# Patient Record
Sex: FEMALE | Race: WHITE | URBAN - METROPOLITAN AREA
[De-identification: names, ages, dates, MRNs, and addresses within clinical notes are randomized per-mention and may not be internally consistent; named-entity substitution may affect disease eponyms.]

---

## 2022-01-21 ENCOUNTER — EMERGENCY (EMERGENCY)
Facility: HOSPITAL | Age: 37
LOS: 1 days | Discharge: HOME | End: 2022-01-21
Attending: EMERGENCY MEDICINE
Payer: COMMERCIAL

## 2022-01-21 VITALS
TEMPERATURE: 98 F | WEIGHT: 154.98 LBS | RESPIRATION RATE: 18 BRPM | DIASTOLIC BLOOD PRESSURE: 83 MMHG | HEIGHT: 64 IN | OXYGEN SATURATION: 100 % | SYSTOLIC BLOOD PRESSURE: 147 MMHG | HEART RATE: 104 BPM

## 2022-01-21 LAB
ALBUMIN SERPL ELPH-MCNC: 4.6 G/DL — SIGNIFICANT CHANGE UP (ref 3.3–5)
ALP SERPL-CCNC: 80 U/L — SIGNIFICANT CHANGE UP (ref 40–120)
ALT FLD-CCNC: 15 U/L — SIGNIFICANT CHANGE UP (ref 10–45)
ANION GAP SERPL CALC-SCNC: 15 MMOL/L — SIGNIFICANT CHANGE UP (ref 5–17)
APTT BLD: 31.7 SEC — SIGNIFICANT CHANGE UP (ref 27.5–35.5)
AST SERPL-CCNC: 12 U/L — SIGNIFICANT CHANGE UP (ref 10–40)
BASOPHILS # BLD AUTO: 0.04 K/UL — SIGNIFICANT CHANGE UP (ref 0–0.2)
BASOPHILS NFR BLD AUTO: 0.3 % — SIGNIFICANT CHANGE UP (ref 0–2)
BILIRUB SERPL-MCNC: 0.4 MG/DL — SIGNIFICANT CHANGE UP (ref 0.2–1.2)
BUN SERPL-MCNC: 17 MG/DL — SIGNIFICANT CHANGE UP (ref 7–23)
CALCIUM SERPL-MCNC: 9.5 MG/DL — SIGNIFICANT CHANGE UP (ref 8.4–10.5)
CHLORIDE SERPL-SCNC: 104 MMOL/L — SIGNIFICANT CHANGE UP (ref 96–108)
CO2 SERPL-SCNC: 23 MMOL/L — SIGNIFICANT CHANGE UP (ref 22–31)
CREAT SERPL-MCNC: 0.94 MG/DL — SIGNIFICANT CHANGE UP (ref 0.5–1.3)
EOSINOPHIL # BLD AUTO: 0.22 K/UL — SIGNIFICANT CHANGE UP (ref 0–0.5)
EOSINOPHIL NFR BLD AUTO: 1.9 % — SIGNIFICANT CHANGE UP (ref 0–6)
GLUCOSE SERPL-MCNC: 91 MG/DL — SIGNIFICANT CHANGE UP (ref 70–99)
HCG SERPL-ACNC: <2 MIU/ML — SIGNIFICANT CHANGE UP
HCT VFR BLD CALC: 40.5 % — SIGNIFICANT CHANGE UP (ref 34.5–45)
HGB BLD-MCNC: 13.6 G/DL — SIGNIFICANT CHANGE UP (ref 11.5–15.5)
IMM GRANULOCYTES NFR BLD AUTO: 0.3 % — SIGNIFICANT CHANGE UP (ref 0–1.5)
INR BLD: 1.05 RATIO — SIGNIFICANT CHANGE UP (ref 0.88–1.16)
LYMPHOCYTES # BLD AUTO: 2.81 K/UL — SIGNIFICANT CHANGE UP (ref 1–3.3)
LYMPHOCYTES # BLD AUTO: 24.3 % — SIGNIFICANT CHANGE UP (ref 13–44)
MCHC RBC-ENTMCNC: 31.5 PG — SIGNIFICANT CHANGE UP (ref 27–34)
MCHC RBC-ENTMCNC: 33.6 GM/DL — SIGNIFICANT CHANGE UP (ref 32–36)
MCV RBC AUTO: 93.8 FL — SIGNIFICANT CHANGE UP (ref 80–100)
MONOCYTES # BLD AUTO: 1.14 K/UL — HIGH (ref 0–0.9)
MONOCYTES NFR BLD AUTO: 9.8 % — SIGNIFICANT CHANGE UP (ref 2–14)
NEUTROPHILS # BLD AUTO: 7.33 K/UL — SIGNIFICANT CHANGE UP (ref 1.8–7.4)
NEUTROPHILS NFR BLD AUTO: 63.4 % — SIGNIFICANT CHANGE UP (ref 43–77)
NRBC # BLD: 0 /100 WBCS — SIGNIFICANT CHANGE UP (ref 0–0)
NT-PROBNP SERPL-SCNC: 21 PG/ML — SIGNIFICANT CHANGE UP (ref 0–300)
PLATELET # BLD AUTO: 375 K/UL — SIGNIFICANT CHANGE UP (ref 150–400)
POTASSIUM SERPL-MCNC: 4 MMOL/L — SIGNIFICANT CHANGE UP (ref 3.5–5.3)
POTASSIUM SERPL-SCNC: 4 MMOL/L — SIGNIFICANT CHANGE UP (ref 3.5–5.3)
PROT SERPL-MCNC: 8.1 G/DL — SIGNIFICANT CHANGE UP (ref 6–8.3)
PROTHROM AB SERPL-ACNC: 12.6 SEC — SIGNIFICANT CHANGE UP (ref 10.6–13.6)
RBC # BLD: 4.32 M/UL — SIGNIFICANT CHANGE UP (ref 3.8–5.2)
RBC # FLD: 12.3 % — SIGNIFICANT CHANGE UP (ref 10.3–14.5)
SARS-COV-2 RNA SPEC QL NAA+PROBE: SIGNIFICANT CHANGE UP
SODIUM SERPL-SCNC: 142 MMOL/L — SIGNIFICANT CHANGE UP (ref 135–145)
TROPONIN T, HIGH SENSITIVITY RESULT: <6 NG/L — SIGNIFICANT CHANGE UP (ref 0–51)
WBC # BLD: 11.58 K/UL — HIGH (ref 3.8–10.5)
WBC # FLD AUTO: 11.58 K/UL — HIGH (ref 3.8–10.5)

## 2022-01-21 PROCEDURE — 93010 ELECTROCARDIOGRAM REPORT: CPT

## 2022-01-21 PROCEDURE — 71045 X-RAY EXAM CHEST 1 VIEW: CPT | Mod: 26

## 2022-01-21 PROCEDURE — 99285 EMERGENCY DEPT VISIT HI MDM: CPT | Mod: 25

## 2022-01-21 NOTE — ED PROVIDER NOTE - NSFOLLOWUPCLINICS_GEN_ALL_ED_FT
[facility] Pulmonolgy and Sleep Medicine  Pulmonology  [address] [place] [address], [place] [address]  Phone: [phone]  Fax:

## 2022-01-21 NOTE — ED PROVIDER NOTE - RAPID ASSESSMENT
36 F was COVID positive about 3 weeks ago now presents to the ED with SOB. Endorses SOB since COVID postive. Attests to ongoing cough. Denies fever and pain on inspiration.     Maximus MUNROE (Sherley) have documented this rapid assessment note under the dictation of Ana Myers (PA) which has been reviewed and affirmed to be accurate. Patient was seen as a QPA patient. The patient will be seen and further worked up in the main emergency department and their care will be completed by the main emergency department team along with a thorough physical exam. Receiving team will follow up on labs, analgesia, any clinical imaging, reassess and disposition as clinically indicated, all decisions regarding the progression of care will be made at their discretion. 36 F was COVID positive about 3 weeks ago now presents to the ED with SOB. Endorses SOB since COVID postive. Attests to ongoing cough. Denies fever and pain on inspiration.     IMaximus (Sherley) have documented this rapid assessment note under the dictation of Ana Myers (PA) which has been reviewed and affirmed to be accurate. Patient was seen as a QPA patient. The patient will be seen and further worked up in the main emergency department and their care will be completed by the main emergency department team along with a thorough physical exam. Receiving team will follow up on labs, analgesia, any clinical imaging, reassess and disposition as clinically indicated, all decisions regarding the progression of care will be made at their discretion.    Rapid assessment by Ana Myers PA-C full eval to be performed in ED. Above documentation completed by scribe above. I was present for and agree with documentation.   Ana Myers PA-C

## 2022-01-21 NOTE — ED PROVIDER NOTE - PROGRESS NOTE DETAILS
Christiano, PGY1 - Patient stable for discharge. Understands the Emergency Room work-up and discharge precautions.

## 2022-01-21 NOTE — ED PROVIDER NOTE - OBJECTIVE STATEMENT
35yo woman with COVID infection around [place], improved, coming in 2/2 acute worsening last night when she woke up from sleep gasping for air. Took about 15 min for her to go back to baseline breathing. Patient has noticed that when she walks or exerts herself she needs to catch her breath before resuming activity. Cough every other day. Denies trouble swallowing, fever, N/V. States that she is anxious she will be unable to breath similar to the first few days of her infection, and that paramedics will need to be called.

## 2022-01-21 NOTE — ED PROVIDER NOTE - NSFOLLOWUPINSTRUCTIONS_ED_ALL_ED_FT
You were seen in the Emergency Room today because of gasping for air. Your chest x-ray did not show any infection. Your lab and imaging results are included in your discharge paperwork. Your COVID results have not come back yet; you can call tomorrow to see whether the results are back.     Please follow-up with your Primary Care Physician within the week for further management of your symptoms. You may also find it reassuring to see a pulmonologist for further workup. The contact information for the Clinic is included in your discharge paperwork.     Please return to the Emergency Room if:   •You have trouble breathing or shortness of breath at rest.  •You have chest pain or pressure that lasts longer than 5 minutes.  •You become confused or hard to wake.  •Your lips or face are blue.  •You have a fever of 104°F (40°C) or higher.

## 2022-01-21 NOTE — ED PROVIDER NOTE - CLINICAL SUMMARY MEDICAL DECISION MAKING FREE TEXT BOX
Christiano, PGY1 - 37yo with previous covid infection presenting with one episode of gasping during sleep, lung exam clear, no SOB at rest or with exertion, lungs clear on chest x-ray. labs non significant. patient stable for discharge. Christiano, PGY1 - 35yo with previous covid infection presenting with one episode of gasping during sleep, lung exam clear, no SOB at rest or with exertion, lungs clear on chest x-ray. labs non significant. patient stable for discharge.  David: 35yo who presents with an episode of waking up gasping last night. had covid weeks ago and is getting better. SOB decreasing slowly for days. episode lasted 10-15min last night. Will ensure this does not represent superinfection or deterioration and if ok will d/c

## 2022-01-21 NOTE — ED PROVIDER NOTE - ATTENDING CONTRIBUTION TO CARE
I performed a history and physical exam of the patient and discussed their management with the resident and /or advanced care provider. I reviewed the resident and /or ACP's note and agree with the documented findings and plan of care. My medical decision making and observations are found above.  Lungs clear, abd soft, nl O2 sat. speaking nl, swallowing nl

## 2022-01-21 NOTE — ED PROVIDER NOTE - PATIENT PORTAL LINK FT
You can access the FollowMyHealth Patient Portal offered by [facility] by registering at the following website: [email or web address]. By joining [employer]’s FollowMyHealth portal, you will also be able to view your health information using other applications (apps) compatible with our system.

## 2022-01-21 NOTE — ED PROVIDER NOTE - PHYSICAL EXAMINATION
Gen: NAD at rest or with ambulation, AOx3, able to make needs known, non-toxic, saturating 99% with ambulation   Head: NCAT  HEENT: EOMI, normal conjunctiva  Lung: CTAB, no respiratory distress, no wheezes/rhonchi/rales B/L, speaking in full sentences  CV: RRR, no M/R/G, pulses bilaterally   MSK: no visible bony deformities  Neuro: No focal sensory or motor deficits  Skin: Warm, well perfused, no rash  Psych: mildly anxious affect Gen: NAD at rest or with ambulation, AOx3, able to make needs known, non-toxic, saturating 99% with ambulation. O2sat 100% at rest, RR 18.  Head: NCAT  HEENT: EOMI, normal conjunctiva  Lung: CTAB, no respiratory distress, no wheezes/rhonchi/rales B/L, speaking in full sentences  CV: RRR, no M/R/G, pulses bilaterally   MSK: no visible bony deformities  Neuro: No focal sensory or motor deficits  Skin: Warm, well perfused, no rash  Psych: mildly anxious affect

## 2022-01-31 ENCOUNTER — EMERGENCY (EMERGENCY)
Facility: HOSPITAL | Age: 37
LOS: 1 days | Discharge: HOME | End: 2022-01-31
Attending: EMERGENCY MEDICINE
Payer: COMMERCIAL

## 2022-01-31 VITALS
TEMPERATURE: 99 F | SYSTOLIC BLOOD PRESSURE: 117 MMHG | OXYGEN SATURATION: 99 % | RESPIRATION RATE: 18 BRPM | DIASTOLIC BLOOD PRESSURE: 76 MMHG | WEIGHT: 164.91 LBS | HEART RATE: 98 BPM

## 2022-01-31 VITALS
DIASTOLIC BLOOD PRESSURE: 74 MMHG | OXYGEN SATURATION: 100 % | RESPIRATION RATE: 16 BRPM | SYSTOLIC BLOOD PRESSURE: 120 MMHG | TEMPERATURE: 98 F | HEART RATE: 99 BPM

## 2022-01-31 LAB
ALBUMIN SERPL ELPH-MCNC: 3.7 G/DL — SIGNIFICANT CHANGE UP (ref 3.5–5)
ALP SERPL-CCNC: 73 U/L — SIGNIFICANT CHANGE UP (ref 40–120)
ALT FLD-CCNC: 21 U/L DA — SIGNIFICANT CHANGE UP (ref 10–60)
ANION GAP SERPL CALC-SCNC: 8 MMOL/L — SIGNIFICANT CHANGE UP (ref 5–17)
AST SERPL-CCNC: 9 U/L — LOW (ref 10–40)
BASOPHILS # BLD AUTO: 0.04 K/UL — SIGNIFICANT CHANGE UP (ref 0–0.2)
BASOPHILS NFR BLD AUTO: 0.4 % — SIGNIFICANT CHANGE UP (ref 0–2)
BILIRUB SERPL-MCNC: 0.6 MG/DL — SIGNIFICANT CHANGE UP (ref 0.2–1.2)
BUN SERPL-MCNC: 17 MG/DL — SIGNIFICANT CHANGE UP (ref 7–18)
CALCIUM SERPL-MCNC: 9.2 MG/DL — SIGNIFICANT CHANGE UP (ref 8.4–10.5)
CHLORIDE SERPL-SCNC: 105 MMOL/L — SIGNIFICANT CHANGE UP (ref 96–108)
CO2 SERPL-SCNC: 25 MMOL/L — SIGNIFICANT CHANGE UP (ref 22–31)
CREAT SERPL-MCNC: 1.02 MG/DL — SIGNIFICANT CHANGE UP (ref 0.5–1.3)
D DIMER BLD IA.RAPID-MCNC: <150 NG/ML DDU — SIGNIFICANT CHANGE UP
EOSINOPHIL # BLD AUTO: 0.02 K/UL — SIGNIFICANT CHANGE UP (ref 0–0.5)
EOSINOPHIL NFR BLD AUTO: 0.2 % — SIGNIFICANT CHANGE UP (ref 0–6)
GLUCOSE SERPL-MCNC: 95 MG/DL — SIGNIFICANT CHANGE UP (ref 70–99)
HCG UR QL: NEGATIVE — SIGNIFICANT CHANGE UP
HCT VFR BLD CALC: 38.9 % — SIGNIFICANT CHANGE UP (ref 34.5–45)
HGB BLD-MCNC: 13.4 G/DL — SIGNIFICANT CHANGE UP (ref 11.5–15.5)
IMM GRANULOCYTES NFR BLD AUTO: 0.3 % — SIGNIFICANT CHANGE UP (ref 0–1.5)
LYMPHOCYTES # BLD AUTO: 1.82 K/UL — SIGNIFICANT CHANGE UP (ref 1–3.3)
LYMPHOCYTES # BLD AUTO: 19.5 % — SIGNIFICANT CHANGE UP (ref 13–44)
MCHC RBC-ENTMCNC: 31.1 PG — SIGNIFICANT CHANGE UP (ref 27–34)
MCHC RBC-ENTMCNC: 34.4 GM/DL — SIGNIFICANT CHANGE UP (ref 32–36)
MCV RBC AUTO: 90.3 FL — SIGNIFICANT CHANGE UP (ref 80–100)
MONOCYTES # BLD AUTO: 0.54 K/UL — SIGNIFICANT CHANGE UP (ref 0–0.9)
MONOCYTES NFR BLD AUTO: 5.8 % — SIGNIFICANT CHANGE UP (ref 2–14)
NEUTROPHILS # BLD AUTO: 6.9 K/UL — SIGNIFICANT CHANGE UP (ref 1.8–7.4)
NEUTROPHILS NFR BLD AUTO: 73.8 % — SIGNIFICANT CHANGE UP (ref 43–77)
NRBC # BLD: 0 /100 WBCS — SIGNIFICANT CHANGE UP (ref 0–0)
PLATELET # BLD AUTO: 438 K/UL — HIGH (ref 150–400)
POTASSIUM SERPL-MCNC: 3.9 MMOL/L — SIGNIFICANT CHANGE UP (ref 3.5–5.3)
POTASSIUM SERPL-SCNC: 3.9 MMOL/L — SIGNIFICANT CHANGE UP (ref 3.5–5.3)
PROT SERPL-MCNC: 8.5 G/DL — HIGH (ref 6–8.3)
RBC # BLD: 4.31 M/UL — SIGNIFICANT CHANGE UP (ref 3.8–5.2)
RBC # FLD: 11.7 % — SIGNIFICANT CHANGE UP (ref 10.3–14.5)
SODIUM SERPL-SCNC: 138 MMOL/L — SIGNIFICANT CHANGE UP (ref 135–145)
T4 AB SER-ACNC: 10.7 UG/DL — SIGNIFICANT CHANGE UP (ref 4.6–12)
TSH SERPL-MCNC: 1.85 UU/ML — SIGNIFICANT CHANGE UP (ref 0.34–4.82)
WBC # BLD: 9.35 K/UL — SIGNIFICANT CHANGE UP (ref 3.8–10.5)
WBC # FLD AUTO: 9.35 K/UL — SIGNIFICANT CHANGE UP (ref 3.8–10.5)

## 2022-01-31 PROCEDURE — 96361 HYDRATE IV INFUSION ADD-ON: CPT

## 2022-01-31 PROCEDURE — 99284 EMERGENCY DEPT VISIT MOD MDM: CPT

## 2022-01-31 PROCEDURE — 36415 COLL VENOUS BLD VENIPUNCTURE: CPT

## 2022-01-31 PROCEDURE — 96360 HYDRATION IV INFUSION INIT: CPT

## 2022-01-31 PROCEDURE — 99283 EMERGENCY DEPT VISIT LOW MDM: CPT | Mod: 25

## 2022-01-31 PROCEDURE — 84443 ASSAY THYROID STIM HORMONE: CPT

## 2022-01-31 PROCEDURE — 85025 COMPLETE CBC W/AUTO DIFF WBC: CPT

## 2022-01-31 PROCEDURE — 85379 FIBRIN DEGRADATION QUANT: CPT

## 2022-01-31 PROCEDURE — 93010 ELECTROCARDIOGRAM REPORT: CPT

## 2022-01-31 PROCEDURE — 93005 ELECTROCARDIOGRAM TRACING: CPT

## 2022-01-31 PROCEDURE — 84436 ASSAY OF TOTAL THYROXINE: CPT

## 2022-01-31 PROCEDURE — 81025 URINE PREGNANCY TEST: CPT

## 2022-01-31 PROCEDURE — 80053 COMPREHEN METABOLIC PANEL: CPT

## 2022-01-31 RX ORDER — SODIUM CHLORIDE 9 MG/ML
1000 INJECTION INTRAMUSCULAR; INTRAVENOUS; SUBCUTANEOUS ONCE
Refills: 0 | Status: COMPLETED | OUTPATIENT
Start: 2022-01-31 | End: 2022-01-31

## 2022-01-31 RX ADMIN — SODIUM CHLORIDE 1000 MILLILITER(S): 9 INJECTION INTRAMUSCULAR; INTRAVENOUS; SUBCUTANEOUS at 16:15

## 2022-01-31 RX ADMIN — SODIUM CHLORIDE 1000 MILLILITER(S): 9 INJECTION INTRAMUSCULAR; INTRAVENOUS; SUBCUTANEOUS at 14:17

## 2022-01-31 NOTE — ED PROVIDER NOTE - OBJECTIVE STATEMENT
36 year old patient with hx of COVID diagnosed December 2021, presenting for intermittent palpitations. She reports initial COVID symptoms at end of December including fatigue, sore throat, and fever that improved early January, however, she has over the past 2 weeks had worsening sensation of fullness in her throat. She is concerned she will be unable to breath and has been sleeping propped up in bed. She has decreased appetite over the past 3 days but denies food getting stuck in her throat/difficulty eating or drinking. She denies trigger for palpitations. She denies dizziness, headache, ear fullness, cough, fever, nausea, vomiting, and diarrhea. She denies history of recent travel, history of blood clot, family history of blood clot, and recent surgery. 36 year old patient with hx of COVID diagnosed December 2021, presenting for intermittent palpitations. She reports initial COVID symptoms at end of December including fatigue, sore throat, and fever that improved early January, however, she has over the past 2 weeks had worsening sensation of fullness in her throat. She presented to another ED last week where she was evaluated for palpitations and globus sensation. She was recommended to see pulmonologist for possible sleep study as potential etiology. She is concerned she will be unable to breath and has been sleeping propped up in bed. She has decreased appetite over the past 3 days but denies food getting stuck in her throat/difficulty eating or drinking. She denies trigger for palpitations. She denies dizziness, headache, ear fullness, cough, fever, nausea, vomiting, and diarrhea. She denies history of recent travel, history of blood clot, family history of blood clot, and recent surgery.

## 2022-01-31 NOTE — ED ADULT NURSE NOTE - CHIEF COMPLAINT QUOTE
pt biba as per the pt " kortney having palpitation for few days on and off " h/o covid positive few weeks ago

## 2022-01-31 NOTE — ED PROVIDER NOTE - CLINICAL SUMMARY MEDICAL DECISION MAKING FREE TEXT BOX
36 year old female with history of COVID in December, presenting with palpitations and SOB. Low suspicion for PE given negative family history, personal history or recent immobilization and negative d-dimer. TSH/T4 WNL. Recommended to patient that she follow up with PCP, pulmonologist, and psychologist/therapist.

## 2022-01-31 NOTE — ED PROVIDER NOTE - PATIENT PORTAL LINK FT
You can access the FollowMyHealth Patient Portal offered by [facility] by registering at the following website: [email or web address]. By joining [employer]’s FollowMyHealth portal, you will also be able to view your health information using other applications (apps) compatible with our system.

## 2022-01-31 NOTE — ED ADULT TRIAGE NOTE - CHIEF COMPLAINT QUOTE
ULNAR NERVE: Mobilization V        Right hand placed upside down over ear. Tilt head away. Keep trunk straight.  Do ___ sets of ___ repetitions per session. Do ___ sessions per day.    Copyright © VHI. All rights reserved.   MEDIAN NERVE: Dynamic Mobility III        With right elbow resting at side, palm out, hand and fingers pulled back with opposite hand. Straighten elbow to the right, return, to the center, return, and to the left.  Do ___ sets of ___ repetitions per session. Do ___ sessions per day.    Copyright © VHI. All rights reserved.     
pt biba as per the pt " kortney having palpitation for few days on and off " h/o covid positive few weeks ago

## 2022-01-31 NOTE — ED PROVIDER NOTE - PLAN OF CARE
36 year old female with history of COVID in December, presenting with palpitations and SOB. Low suspicion for PE given negative family history, personal history or recent immobilization so will f/u PE. TSH to assess palpitations. CBC, CMP. 36 year old female with history of COVID in December, presenting with palpitations and SOB. Low suspicion for PE given negative family history, personal history or recent immobilization so will f/u PE. TSH to assess palpitations. 36 year old female with history of COVID in December, presenting with palpitations and SOB. Low suspicion for PE given negative family history, personal history or recent immobilization so will f/u d-dimer. TSH to assess palpitations.

## 2022-01-31 NOTE — ED ADULT NURSE NOTE - NSIMPLEMENTINTERV_GEN_ALL_ED
Implemented All Universal Safety Interventions:  [place] to call system. Call bell, personal items and telephone within reach. Instruct patient to call for assistance. Room bathroom lighting operational. Non-slip footwear when patient is off stretcher. Physically safe environment: no spills, clutter or unnecessary equipment. Stretcher in lowest position, wheels locked, appropriate side rails in place.

## 2022-01-31 NOTE — ED PROVIDER NOTE - NSICDXFAMILYHX_GEN_ALL_CORE_FT
FAMILY HISTORY:  Sibling  Still living? Unknown  FH: hypothyroidism, Age at diagnosis: Age Unknown

## 2022-01-31 NOTE — ED PROVIDER NOTE - CARE PLAN
Assessment and plan of treatment:	36 year old female with history of COVID in December, presenting with palpitations and SOB. Low suspicion for PE given negative family history, personal history or recent immobilization so will f/u PE. TSH to assess palpitations. CBC, CMP.   Assessment and plan of treatment:	36 year old female with history of COVID in December, presenting with palpitations and SOB. Low suspicion for PE given negative family history, personal history or recent immobilization so will f/u PE. TSH to assess palpitations.   Assessment and plan of treatment:	36 year old female with history of COVID in December, presenting with palpitations and SOB. Low suspicion for PE given negative family history, personal history or recent immobilization so will f/u d-dimer. TSH to assess palpitations.

## 2022-01-31 NOTE — ED PROVIDER NOTE - ATTENDING CONTRIBUTION TO CARE
36 year old female with history of COVID in December, presenting with palpitations and SOB. Low suspicion for PE given negative family history, personal history or recent immobilization so will f/u d-dimer. TSH to assess palpitations.

## 2024-04-29 RX ORDER — AZITHROMYCIN 500 MG/1
0 TABLET, FILM COATED ORAL
Qty: 0 | Refills: 0 | DISCHARGE

## 2024-08-16 PROBLEM — Z00.00 ENCOUNTER FOR PREVENTIVE HEALTH EXAMINATION: Status: ACTIVE | Noted: 2024-08-16

## 2024-10-03 ENCOUNTER — EMERGENCY (EMERGENCY)
Facility: HOSPITAL | Age: 39
LOS: 1 days | Discharge: HOME | End: 2024-10-03
Attending: EMERGENCY MEDICINE
Payer: COMMERCIAL

## 2024-10-03 VITALS
RESPIRATION RATE: 18 BRPM | DIASTOLIC BLOOD PRESSURE: 87 MMHG | OXYGEN SATURATION: 99 % | HEART RATE: 73 BPM | TEMPERATURE: 98 F | SYSTOLIC BLOOD PRESSURE: 134 MMHG

## 2024-10-03 VITALS
OXYGEN SATURATION: 99 % | WEIGHT: 169.98 LBS | HEART RATE: 81 BPM | SYSTOLIC BLOOD PRESSURE: 159 MMHG | RESPIRATION RATE: 20 BRPM | HEIGHT: 64 IN | DIASTOLIC BLOOD PRESSURE: 93 MMHG | TEMPERATURE: 99 F

## 2024-10-03 PROBLEM — U07.1 COVID-19: Chronic | Status: ACTIVE | Noted: 2022-01-21

## 2024-10-03 PROBLEM — Z78.9 OTHER SPECIFIED HEALTH STATUS: Chronic | Status: ACTIVE | Noted: 2022-01-31

## 2024-10-03 PROCEDURE — 99283 EMERGENCY DEPT VISIT LOW MDM: CPT

## 2024-10-03 RX ORDER — ANTI-ITCH CREAM 1 G/100G
1 OINTMENT TOPICAL
Qty: 1 | Refills: 0
Start: 2024-10-03

## 2024-10-03 NOTE — ED PROVIDER NOTE - NSFOLLOWUPINSTRUCTIONS_ED_ALL_ED_FT
Findings most likely consistent with contact dermatitis. Recommend using fragrance free lotions and creams and keeping skin moisturized.     May use topical steroid hydrocortisone cream  twice a day to affected area.     If rash does not resolve or improve in 3 days, please follow up with dermatology.     If you develop difficulty breathing, vision problems, severe headache or other concerns please come back to the ED.

## 2024-10-03 NOTE — ED ADULT TRIAGE NOTE - PATIENT ON (OXYGEN DELIVERY METHOD)
01/12/2024   [facility] [employer]  N/A  For questions about this resource list or additional care needs, please contact your primary care clinic or care manager.  Phone: [phone]   Email: N/A   Address: [address] [place] [id number]   Hours: N/A        Financial Stability       Rent and mortgage payment assistance  1  [employer] - [place] [address] [place] Distance: 2.06 miles      In-Person, Phone/Virtual   [address] [place] [address]  Language: English, Lao  Hours: Mon - Fri 8:30 AM - 5:00 PM  Fees: Free   Phone: (651) 390-5303 Email: info@[employer].org Website: http://www.[employer].org     2  [facility] [employer] [facility] - [place] - Supportive Housing Assistance Program (SHAP) - Rent and mortgage payment assistance Distance: 2.28 miles      Phone/Virtual   [address] [place] [address]  Language: English, Hmong, Penelope, Polish  Hours: Mon - Fri 8:30 AM - 5:00 PM  Fees: Free   Phone: (780) 471-3327 Email: renae@Facebook.org Website: http://www.hmong.org/hap-impact-areas/          Important Numbers & Websites       Emergency Services   911  City Services   311  Poison Control   (620) 380-5500  Suicide Prevention Lifeline   (910) 818-5249 (TALK)  Child Abuse Hotline   (500) 247-1765 (4-A-Child)  Sexual Assault Hotline   (424) 204-9223 (HOPE)  National Runaway Safeline   (814) 208-6376 (RUNAWAY)  All-Options Talkline   (765) 740-1746  Substance Abuse Referral   (228) 971-4651 (HELP)    
room air

## 2024-10-03 NOTE — ED PROVIDER NOTE - PHYSICAL EXAMINATION
Const:  Alert and interactive, no acute distress  HEENT: Moist mucosa; Oropharynx clear, without lesions; Neck supple  Lymph: No significant lymphadenopathy  CV: Heart regular, normal S1/2, no murmurs; Extremities WWPx4  Pulm: Lungs clear to auscultation bilaterally  GI: Abdomen non-distended; No organomegaly, no tenderness, no masses  Skin: With erythematous patches over bilateral eyelids, no papules, blistering noted. R hand with erythematous plaque between 1st and 2nd fingers   Neuro: Alert; Normal tone; AxOx4

## 2024-10-03 NOTE — ED PROVIDER NOTE - NSFOLLOWUPCLINICS_GEN_ALL_ED_FT
[facility]  Dermatology  [address], [address]  [place] [address]  Phone: [phone]  Fax:

## 2024-10-03 NOTE — ED PROVIDER NOTE - PATIENT PORTAL LINK FT
You can access the FollowMyHealth Patient Portal offered by [facility] by registering at the following website: [email or web address]. By joining [employer]’s FollowMyHealth portal, you will also be able to view your health information using other applications (apps) compatible with our system.

## 2024-10-03 NOTE — ED PROVIDER NOTE - OBJECTIVE STATEMENT
38 year old F hx of psoriasis presenting with new rash around eyes. Started 12 hrs ago with bilateral eye upper and lower lid erythema with conjunctival injection. Denies any new soaps, detergents, creams, or other new contacts. No new rash elsewhere. No joint swelling, No mouth ulcers. No fevers. Had similar rash one month ago, now resolved.     Following outpatient for history of elevated Cr several months ago, since then improving. Had been taking naproxen and gabapentin for sciatica, now off meds. 38 year old F hx of psoriasis presenting with new rash around eyes. Started 12 hrs ago with bilateral eye upper and lower lid patches of erythema with conjunctival injection. Denies any new soaps, detergents, creams, or other new contacts. No new rash elsewhere. No joint swelling, No mouth ulcers. No fevers. Had similar rash one month ago, now resolved.     Following outpatient for history of elevated Cr several months ago, since then improving (most recent with normal Cr of 0.94). Had been taking naproxen and gabapentin for sciatica, now off meds.

## 2024-10-03 NOTE — ED ADULT NURSE NOTE - OBJECTIVE STATEMENT
patient is a 39 y/o F with no pertinent PMH who presents to the ED via triage c/o abnormal labs. patient states that she had routine testing done in july and was found to have protein in the urine and elevated ferritin levels. patient x2 days with redness/puffiness noted to b/l eyes which prompted her ED visit. patient without other complaints. a&ox4, PERRL. ambulatory independently. respirations even and unlabored. abdomen soft, nondistended. denies fevers/chills, numbness/tingling, weakness, headache, dizziness, vision changes, cp, sob, cough, abd pain, n/v/d, dysuria, hematuria, bloody stools, back pain.

## 2024-10-03 NOTE — ED PROVIDER NOTE - CLINICAL SUMMARY MEDICAL DECISION MAKING FREE TEXT BOX
38 yr old hx psoriasis p/w one day facial rash. Exam consistent with contact dermatitis. Will dc home with dermatology follow up and steroid cream. 38 yr old hx psoriasis p/w one day facial rash. Exam consistent with contact dermatitis. Will dc home with dermatology follow up and steroid cream.    Michelle Rowland MD - Attending Physician: Pt here with rash to eyes/face. Scaly patches of erythema over lids c/w likely contact dermatitis. No red flag symptoms. No systemic signs. Supportive care with topical creams, follow-up with Derm, f/u with PMD

## 2024-10-03 NOTE — ED ADULT NURSE REASSESSMENT NOTE - NS ED NURSE REASSESS COMMENT FT1
Report taken from KIANA Beckman. No acute distress present at this time. Family is present at bedside, updated on POC. Bed is in lowest position.

## 2024-10-04 ENCOUNTER — TRANSCRIPTION ENCOUNTER (OUTPATIENT)
Age: 39
End: 2024-10-04

## 2024-10-08 ENCOUNTER — APPOINTMENT (OUTPATIENT)
Dept: FAMILY MEDICINE | Facility: CLINIC | Age: 39
End: 2024-10-08

## 2025-07-28 ENCOUNTER — EMERGENCY (EMERGENCY)
Facility: HOSPITAL | Age: 40
LOS: 1 days | End: 2025-07-28
Attending: STUDENT IN AN ORGANIZED HEALTH CARE EDUCATION/TRAINING PROGRAM
Payer: COMMERCIAL

## 2025-07-28 VITALS
OXYGEN SATURATION: 99 % | HEIGHT: 63 IN | DIASTOLIC BLOOD PRESSURE: 84 MMHG | TEMPERATURE: 98 F | HEART RATE: 91 BPM | SYSTOLIC BLOOD PRESSURE: 142 MMHG | WEIGHT: 169.98 LBS | RESPIRATION RATE: 18 BRPM

## 2025-07-28 VITALS
SYSTOLIC BLOOD PRESSURE: 136 MMHG | OXYGEN SATURATION: 100 % | RESPIRATION RATE: 18 BRPM | DIASTOLIC BLOOD PRESSURE: 78 MMHG | HEART RATE: 89 BPM

## 2025-07-28 LAB
ADD ON TEST-SPECIMEN IN LAB: SIGNIFICANT CHANGE UP
ALBUMIN SERPL ELPH-MCNC: 4.3 G/DL — SIGNIFICANT CHANGE UP (ref 3.3–5)
ALP SERPL-CCNC: 79 U/L — SIGNIFICANT CHANGE UP (ref 40–120)
ALT FLD-CCNC: 17 U/L — SIGNIFICANT CHANGE UP (ref 10–45)
ANION GAP SERPL CALC-SCNC: 14 MMOL/L — SIGNIFICANT CHANGE UP (ref 5–17)
AST SERPL-CCNC: 14 U/L — SIGNIFICANT CHANGE UP (ref 10–40)
BASOPHILS # BLD AUTO: 0.04 K/UL — SIGNIFICANT CHANGE UP (ref 0–0.2)
BASOPHILS NFR BLD AUTO: 0.6 % — SIGNIFICANT CHANGE UP (ref 0–2)
BILIRUB SERPL-MCNC: 0.4 MG/DL — SIGNIFICANT CHANGE UP (ref 0.2–1.2)
BUN SERPL-MCNC: 17 MG/DL — SIGNIFICANT CHANGE UP (ref 7–23)
CALCIUM SERPL-MCNC: 9 MG/DL — SIGNIFICANT CHANGE UP (ref 8.4–10.5)
CHLORIDE SERPL-SCNC: 106 MMOL/L — SIGNIFICANT CHANGE UP (ref 96–108)
CO2 SERPL-SCNC: 19 MMOL/L — LOW (ref 22–31)
CREAT SERPL-MCNC: 0.86 MG/DL — SIGNIFICANT CHANGE UP (ref 0.5–1.3)
D DIMER BLD IA.RAPID-MCNC: <150 NG/ML DDU — SIGNIFICANT CHANGE UP
EGFR: 87 ML/MIN/1.73M2 — SIGNIFICANT CHANGE UP
EGFR: 87 ML/MIN/1.73M2 — SIGNIFICANT CHANGE UP
EOSINOPHIL # BLD AUTO: 0.23 K/UL — SIGNIFICANT CHANGE UP (ref 0–0.5)
EOSINOPHIL NFR BLD AUTO: 3.2 % — SIGNIFICANT CHANGE UP (ref 0–6)
FLUAV AG NPH QL: SIGNIFICANT CHANGE UP
FLUBV AG NPH QL: SIGNIFICANT CHANGE UP
GLUCOSE SERPL-MCNC: 96 MG/DL — SIGNIFICANT CHANGE UP (ref 70–99)
HCG SERPL-ACNC: <2 MIU/ML — SIGNIFICANT CHANGE UP
HCT VFR BLD CALC: 40.1 % — SIGNIFICANT CHANGE UP (ref 34.5–45)
HGB BLD-MCNC: 13.4 G/DL — SIGNIFICANT CHANGE UP (ref 11.5–15.5)
IMM GRANULOCYTES # BLD AUTO: 0.03 K/UL — SIGNIFICANT CHANGE UP (ref 0–0.07)
IMM GRANULOCYTES NFR BLD AUTO: 0.4 % — SIGNIFICANT CHANGE UP (ref 0–0.9)
LIDOCAIN IGE QN: 40 U/L — SIGNIFICANT CHANGE UP (ref 7–60)
LYMPHOCYTES # BLD AUTO: 1.77 K/UL — SIGNIFICANT CHANGE UP (ref 1–3.3)
LYMPHOCYTES NFR BLD AUTO: 24.4 % — SIGNIFICANT CHANGE UP (ref 13–44)
MAGNESIUM SERPL-MCNC: 2 MG/DL — SIGNIFICANT CHANGE UP (ref 1.6–2.6)
MCHC RBC-ENTMCNC: 31.2 PG — SIGNIFICANT CHANGE UP (ref 27–34)
MCHC RBC-ENTMCNC: 33.4 G/DL — SIGNIFICANT CHANGE UP (ref 32–36)
MCV RBC AUTO: 93.5 FL — SIGNIFICANT CHANGE UP (ref 80–100)
MONOCYTES # BLD AUTO: 0.7 K/UL — SIGNIFICANT CHANGE UP (ref 0–0.9)
MONOCYTES NFR BLD AUTO: 9.7 % — SIGNIFICANT CHANGE UP (ref 2–14)
NEUTROPHILS # BLD AUTO: 4.48 K/UL — SIGNIFICANT CHANGE UP (ref 1.8–7.4)
NEUTROPHILS NFR BLD AUTO: 61.7 % — SIGNIFICANT CHANGE UP (ref 43–77)
NRBC # BLD AUTO: 0 K/UL — SIGNIFICANT CHANGE UP (ref 0–0)
NRBC # FLD: 0 K/UL — SIGNIFICANT CHANGE UP (ref 0–0)
NRBC BLD AUTO-RTO: 0 /100 WBCS — SIGNIFICANT CHANGE UP (ref 0–0)
PLATELET # BLD AUTO: 356 K/UL — SIGNIFICANT CHANGE UP (ref 150–400)
PMV BLD: 10.8 FL — SIGNIFICANT CHANGE UP (ref 7–13)
POTASSIUM SERPL-MCNC: 4.2 MMOL/L — SIGNIFICANT CHANGE UP (ref 3.5–5.3)
POTASSIUM SERPL-SCNC: 4.2 MMOL/L — SIGNIFICANT CHANGE UP (ref 3.5–5.3)
PROT SERPL-MCNC: 7.6 G/DL — SIGNIFICANT CHANGE UP (ref 6–8.3)
RBC # BLD: 4.29 M/UL — SIGNIFICANT CHANGE UP (ref 3.8–5.2)
RBC # FLD: 11.9 % — SIGNIFICANT CHANGE UP (ref 10.3–14.5)
RSV RNA NPH QL NAA+NON-PROBE: SIGNIFICANT CHANGE UP
SARS-COV-2 RNA SPEC QL NAA+PROBE: SIGNIFICANT CHANGE UP
SODIUM SERPL-SCNC: 139 MMOL/L — SIGNIFICANT CHANGE UP (ref 135–145)
SOURCE RESPIRATORY: SIGNIFICANT CHANGE UP
TROPONIN T, HIGH SENSITIVITY RESULT: <6 NG/L — SIGNIFICANT CHANGE UP (ref 0–51)
WBC # BLD: 7.25 K/UL — SIGNIFICANT CHANGE UP (ref 3.8–10.5)
WBC # FLD AUTO: 7.25 K/UL — SIGNIFICANT CHANGE UP (ref 3.8–10.5)

## 2025-07-28 PROCEDURE — 99285 EMERGENCY DEPT VISIT HI MDM: CPT | Mod: 25

## 2025-07-28 PROCEDURE — 93010 ELECTROCARDIOGRAM REPORT: CPT

## 2025-07-28 PROCEDURE — 84702 CHORIONIC GONADOTROPIN TEST: CPT

## 2025-07-28 PROCEDURE — 83690 ASSAY OF LIPASE: CPT

## 2025-07-28 PROCEDURE — 93005 ELECTROCARDIOGRAM TRACING: CPT

## 2025-07-28 PROCEDURE — 71046 X-RAY EXAM CHEST 2 VIEWS: CPT | Mod: 26

## 2025-07-28 PROCEDURE — 87637 SARSCOV2&INF A&B&RSV AMP PRB: CPT

## 2025-07-28 PROCEDURE — 36000 PLACE NEEDLE IN VEIN: CPT

## 2025-07-28 PROCEDURE — 99285 EMERGENCY DEPT VISIT HI MDM: CPT

## 2025-07-28 PROCEDURE — 85025 COMPLETE CBC W/AUTO DIFF WBC: CPT

## 2025-07-28 PROCEDURE — 83735 ASSAY OF MAGNESIUM: CPT

## 2025-07-28 PROCEDURE — 84484 ASSAY OF TROPONIN QUANT: CPT

## 2025-07-28 PROCEDURE — 71046 X-RAY EXAM CHEST 2 VIEWS: CPT

## 2025-07-28 PROCEDURE — 80053 COMPREHEN METABOLIC PANEL: CPT

## 2025-07-28 PROCEDURE — 85379 FIBRIN DEGRADATION QUANT: CPT

## 2025-07-28 RX ORDER — ASPIRIN 325 MG
162 TABLET ORAL ONCE
Refills: 0 | Status: COMPLETED | OUTPATIENT
Start: 2025-07-28 | End: 2025-07-28

## 2025-07-28 RX ADMIN — Medication 162 MILLIGRAM(S): at 10:25

## 2025-07-28 NOTE — ED PROVIDER NOTE - PATIENT PORTAL LINK FT
You can access the FollowMyHealth Patient Portal offered by [facility] by registering at the following website: [email or web address]. By joining [employer]’s FollowMyHealth portal, you will also be able to view your health information using other applications (apps) compatible with our system.

## 2025-07-28 NOTE — ED PROVIDER NOTE - PROGRESS NOTE DETAILS
Jayson Weller MD (Attending Physician): Labs and imaging non-actionable. Pt was re-evaluated at bedside, VSS, feeling better overall. Results were discussed with patient as well as return precautions and follow up plan with cardiologist. Time was taken to answer any questions that the patient had before providing them with discharge paperwork.

## 2025-07-28 NOTE — ED PROVIDER NOTE - ATTENDING CONTRIBUTION TO CARE
Jayson Weller MD (Attending Physician):    I performed a history and physical exam of the patient and discussed their management with the resident/fellow/ACP/student. I have reviewed the resident/fellow/ACP/student note and agree with the documented findings and plan of care, except as noted. I have personally performed a substantive portion of the visit including all aspects of the medical decision making. My medical decision making and observations are found below. Please refer to any progress notes for updates on clinical course.    HPI:  Pt is a 38 yo female with pmhx anxiety who presents for CP x1w. Pt reports intermittent nonradiating nonpleuritic L sided dull CP x1w, also reports 1 episode "heart fluttering" sensation that woke her up at midnight last night. Pt also reports L deltoid/tricep soreness x1w, pain worsens upon exertion. Pt denies fever, HA, SOB, F[place], n/v, dysuria, hematuria, diaphoresis. Denies leg swelling, pleuritic CP, use of hormonal medications, recent long travel. Pt has chronic microscopic hematuria/proteinuria, follows with nephro, no official dx as of yet. Pt's b/l axillary lymph nodes are swollen, pt says this happens with her period and follows with obgyn, prior w/u reveals no malignancy.    PE:  Vitals noted  GEN - NAD, well appearing, A&Ox3  HEAD - NC/AT  EYES - PERRL, EOMI  ENT - Airway patent, mucous membranes moist  PULMONARY - Normal wob, CTA b/l, symmetric breath sounds, no W/R/R, satting 98% on RA  CARDIAC - +S1S2, RRR, no M/G/R, no JVD  CHEST - B/l chest wall NT  ABDOMEN - +BS, ND, NT, soft, no guarding, no rebound, no masses, no rigidity   - No CVA TTP b/  EXTREMITIES - FROM, symmetric pulses, no edema. B/l calves NT.  SKIN - No rash or bruising  NEUROLOGIC - Alert, speech clear, moving all extremities spontaneously, no focal deficits  PSYCH - Normal mood/affect, normal insight    MDM:  DDx includes, but not limited to: ACS, PE, PTX, PNA, viral syndrome, pancreatitis, gastritis, anxiety. ekg, cxr, labs including troponin and d-dimer, aspirin. Triage ekg NSR without any obvious ischemic changes. HEART score 0-1 as of now. Dispo pending workup and reassessment.

## 2025-07-28 NOTE — ED ADULT NURSE NOTE - OBJECTIVE STATEMENT
39 year old female no PMH presents to the ED through triage, from home, complaining of chest discomfort radiating down left arm since 1am. Pt. describes pain as pressure like sensation and soreness in left arm "like when you fall asleep on your arm," Patient has never had chest pain before this episode. Pt. denies taking anything for pain prior to coming to hospital. Denies recent travel. LMP 2 weeks ago. Denies fever/chills, SOB, vision changes, abd pain, N/V/D, urinary symptoms. VSS. EKG completed in triage and reviewed by attending MD. Patient was placed on cardiac monitor in gold3 - NSR 70s-80s. 20g placed in R AC and labs drawn.   Patient undressed and placed into gown, call bell in hand and side rails up with bed in lowest position for safety. blanket provided. Comfort and safety provided.

## 2025-07-28 NOTE — ED ADULT NURSE NOTE - NSFALLUNIVINTERV_ED_ALL_ED
Bed/Stretcher in lowest position, wheels locked, appropriate side rails in place/Call bell, personal items and telephone in reach/Instruct patient to call for assistance before getting out of bed/chair/stretcher/Non-slip footwear applied when patient is off stretcher/[place] to call system/Physically safe environment - no spills, clutter or unnecessary equipment/Purposeful proactive rounding/Room/bathroom lighting operational, light cord in reach

## 2025-07-28 NOTE — ED PROVIDER NOTE - CLINICAL SUMMARY MEDICAL DECISION MAKING FREE TEXT BOX
Pt is a 39 yof pmhx anxiety who presents for dull L-sided CP x1w. Pt denies pleuritic pain, SOB, FELTON, n/v, diaphoresis. No cardiac hx, no clotting disorders, no recent long travel, no use of hormonal medications. Differential diagnosis includes but not limited to ACS v PE v costochondritis.    Low suspicion for ACS given normal EKG, no cardiac hx or cardiac prodrome, HEART score 0. Low suspicion for PE given no pleuritic CP, no SOB, no hx recent long travel or use of procoag meds, PERC negative.    Will fu labs, trop, CXR, dimer. Likely dispo dc home with outpt cards fu. Pt is a 39 yof pmhx anxiety who presents for dull L-sided CP x1w. Pt denies pleuritic pain, SOB, FELTON, n/v, diaphoresis. No cardiac hx, no clotting disorders, no recent long travel, no use of hormonal medications. Differential diagnosis includes but not limited to ACS v PE v costochondritis.    Low suspicion for ACS given normal EKG, no cardiac hx or cardiac prodrome, HEART score 0. Low suspicion for PE given no pleuritic CP, no SOB, no hx recent long travel or use of procoag meds, PERC negative.    Will fu labs, trop, CXR, dimer.     Negative trop, dimer, CXR.

## 2025-07-28 NOTE — ED ADULT NURSE NOTE - CADM POA URETHRAL CATHETER
[facility] at [facility] I said I will let the doctor know to put the order in and notify the patient when the referral is ready.   No

## 2025-07-28 NOTE — ED PROVIDER NOTE - NSFOLLOWUPINSTRUCTIONS_ED_ALL_ED_FT
You were seen today for chest pain. Your EKG did not show signs of a heart attack. Please follow up with your cardiologist within 1w. You may take Advil at home for pain relief.     Chest pain can be caused by many different conditions which may or may not be dangerous. Causes include heartburn, lung infections, heart attack, blood clot in lungs, skin infections, strain or damage to muscle, cartilage, or bones, etc. In addition to a history and physical examination, an electrocardiogram (ECG) or other lab tests may have been performed to determine the cause of your chest pain. Follow up with your primary care provider or with a cardiologist as instructed.     SEEK IMMEDIATE MEDICAL CARE IF YOU HAVE ANY OF THE FOLLOWING SYMPTOMS: worsening chest pain, coughing up blood, unexplained back/neck/jaw pain, severe abdominal pain, dizziness or lightheadedness, fainting, shortness of breath, sweaty or clammy skin, vomiting, or racing heart beat. These symptoms may represent a serious problem that is an emergency. Do not wait to see if the symptoms will go away. Get medical help right away. Call 911 and do not drive yourself to the hospital.

## 2025-07-28 NOTE — ED PROVIDER NOTE - PHYSICAL EXAMINATION
PHYSICAL EXAM:  Constitutional: WD, NAD  HEENT: EOMI, sclera non-icteric  Respiratory: CTA b/l, good air entry b/l, no wheezing, no rhonchi, no rales, without accessory muscle use and no intercostal retractions  Cardiovascular: RRR, normal S1S2, no M/R/G  Gastrointestinal: soft, NTND, no masses palpable, BS normal  Extremities: mild tenderness to palpation of L deltoid and tricep with no edema/trauma/skin changes, all warm, well perfused, pulses equal bilateral upper and lower extremities, no edema, no clubbing, capillary refill <2 sec  Neurological: AAOx3, CN grossly intact  Skin: normal temperature, warm, dry

## 2025-07-28 NOTE — ED PROVIDER NOTE - OBJECTIVE STATEMENT
Pt is a 39 yof pmhx anxiety who presents for CP x1w. Pt reports intermittent nonradiating nonpleuritic L sided dull CP x1w, also reports 1 episode "heart fluttering" sensation that woke her up at midnight last night. Pt also reports L deltoid/tricep soreness x1w, pain worsens upon exertion. Pt denies fever, HA, SOB, FELTON, n/v, dysuria, hematuria, diaphoresis. Denies leg swelling, pleuritic CP, use of hormonal medications, recent long travel.     Pt has chronic microscopic hematuria/proteinuria, follows with nephro, no dx. Pt's b/l axillary lymph nodes are swollen, pt says this happens with her period and follows with obgyn, w/u reveals no malignancy.